# Patient Record
Sex: MALE | Race: OTHER | HISPANIC OR LATINO | URBAN - METROPOLITAN AREA
[De-identification: names, ages, dates, MRNs, and addresses within clinical notes are randomized per-mention and may not be internally consistent; named-entity substitution may affect disease eponyms.]

---

## 2018-08-02 ENCOUNTER — EMERGENCY (EMERGENCY)
Facility: HOSPITAL | Age: 63
LOS: 1 days | Discharge: HOME | End: 2018-08-02
Attending: EMERGENCY MEDICINE | Admitting: EMERGENCY MEDICINE
Payer: SELF-PAY

## 2018-08-02 VITALS
SYSTOLIC BLOOD PRESSURE: 142 MMHG | DIASTOLIC BLOOD PRESSURE: 88 MMHG | RESPIRATION RATE: 18 BRPM | OXYGEN SATURATION: 97 % | HEART RATE: 59 BPM

## 2018-08-02 VITALS
RESPIRATION RATE: 18 BRPM | OXYGEN SATURATION: 99 % | HEART RATE: 61 BPM | TEMPERATURE: 97 F | SYSTOLIC BLOOD PRESSURE: 156 MMHG | DIASTOLIC BLOOD PRESSURE: 103 MMHG

## 2018-08-02 DIAGNOSIS — R06.02 SHORTNESS OF BREATH: ICD-10-CM

## 2018-08-02 DIAGNOSIS — F17.200 NICOTINE DEPENDENCE, UNSPECIFIED, UNCOMPLICATED: ICD-10-CM

## 2018-08-02 LAB
ALBUMIN SERPL ELPH-MCNC: 4.2 G/DL — SIGNIFICANT CHANGE UP (ref 3.3–5)
ALP SERPL-CCNC: 63 U/L — SIGNIFICANT CHANGE UP (ref 40–120)
ALT FLD-CCNC: 25 U/L — SIGNIFICANT CHANGE UP (ref 10–45)
ANION GAP SERPL CALC-SCNC: 13 MMOL/L — SIGNIFICANT CHANGE UP (ref 5–17)
APTT BLD: 28.8 SEC — SIGNIFICANT CHANGE UP (ref 27.5–37.4)
AST SERPL-CCNC: 25 U/L — SIGNIFICANT CHANGE UP (ref 10–40)
BASOPHILS NFR BLD AUTO: 0.2 % — SIGNIFICANT CHANGE UP (ref 0–2)
BILIRUB SERPL-MCNC: 0.4 MG/DL — SIGNIFICANT CHANGE UP (ref 0.2–1.2)
BUN SERPL-MCNC: 14 MG/DL — SIGNIFICANT CHANGE UP (ref 7–23)
CALCIUM SERPL-MCNC: 9.4 MG/DL — SIGNIFICANT CHANGE UP (ref 8.4–10.5)
CHLORIDE SERPL-SCNC: 102 MMOL/L — SIGNIFICANT CHANGE UP (ref 96–108)
CK MB CFR SERPL CALC: 4.4 NG/ML — SIGNIFICANT CHANGE UP (ref 0–6.7)
CK SERPL-CCNC: 277 U/L — HIGH (ref 30–200)
CO2 SERPL-SCNC: 24 MMOL/L — SIGNIFICANT CHANGE UP (ref 22–31)
CREAT SERPL-MCNC: 0.82 MG/DL — SIGNIFICANT CHANGE UP (ref 0.5–1.3)
EOSINOPHIL NFR BLD AUTO: 0.4 % — SIGNIFICANT CHANGE UP (ref 0–6)
GLUCOSE SERPL-MCNC: 119 MG/DL — HIGH (ref 70–99)
HCT VFR BLD CALC: 41.6 % — SIGNIFICANT CHANGE UP (ref 39–50)
HGB BLD-MCNC: 13.1 G/DL — SIGNIFICANT CHANGE UP (ref 13–17)
INR BLD: 0.99 — SIGNIFICANT CHANGE UP (ref 0.88–1.16)
LYMPHOCYTES # BLD AUTO: 8.5 % — LOW (ref 13–44)
MCHC RBC-ENTMCNC: 26.7 PG — LOW (ref 27–34)
MCHC RBC-ENTMCNC: 31.5 G/DL — LOW (ref 32–36)
MCV RBC AUTO: 84.9 FL — SIGNIFICANT CHANGE UP (ref 80–100)
MONOCYTES NFR BLD AUTO: 6.7 % — SIGNIFICANT CHANGE UP (ref 2–14)
NEUTROPHILS NFR BLD AUTO: 84.2 % — HIGH (ref 43–77)
PLATELET # BLD AUTO: 220 K/UL — SIGNIFICANT CHANGE UP (ref 150–400)
POTASSIUM SERPL-MCNC: 4.1 MMOL/L — SIGNIFICANT CHANGE UP (ref 3.5–5.3)
POTASSIUM SERPL-SCNC: 4.1 MMOL/L — SIGNIFICANT CHANGE UP (ref 3.5–5.3)
PROT SERPL-MCNC: 7 G/DL — SIGNIFICANT CHANGE UP (ref 6–8.3)
PROTHROM AB SERPL-ACNC: 11 SEC — SIGNIFICANT CHANGE UP (ref 9.8–12.7)
RBC # BLD: 4.9 M/UL — SIGNIFICANT CHANGE UP (ref 4.2–5.8)
RBC # FLD: 13.9 % — SIGNIFICANT CHANGE UP (ref 10.3–16.9)
SODIUM SERPL-SCNC: 139 MMOL/L — SIGNIFICANT CHANGE UP (ref 135–145)
TROPONIN T SERPL-MCNC: <0.01 NG/ML — SIGNIFICANT CHANGE UP (ref 0–0.01)
WBC # BLD: 9.4 K/UL — SIGNIFICANT CHANGE UP (ref 3.8–10.5)
WBC # FLD AUTO: 9.4 K/UL — SIGNIFICANT CHANGE UP (ref 3.8–10.5)

## 2018-08-02 PROCEDURE — 84484 ASSAY OF TROPONIN QUANT: CPT

## 2018-08-02 PROCEDURE — 83880 ASSAY OF NATRIURETIC PEPTIDE: CPT

## 2018-08-02 PROCEDURE — 99283 EMERGENCY DEPT VISIT LOW MDM: CPT | Mod: 25

## 2018-08-02 PROCEDURE — 71045 X-RAY EXAM CHEST 1 VIEW: CPT

## 2018-08-02 PROCEDURE — 93010 ELECTROCARDIOGRAM REPORT: CPT

## 2018-08-02 PROCEDURE — 71045 X-RAY EXAM CHEST 1 VIEW: CPT | Mod: 26

## 2018-08-02 PROCEDURE — 85610 PROTHROMBIN TIME: CPT

## 2018-08-02 PROCEDURE — 99053 MED SERV 10PM-8AM 24 HR FAC: CPT

## 2018-08-02 PROCEDURE — 85025 COMPLETE CBC W/AUTO DIFF WBC: CPT

## 2018-08-02 PROCEDURE — 85730 THROMBOPLASTIN TIME PARTIAL: CPT

## 2018-08-02 PROCEDURE — 93005 ELECTROCARDIOGRAM TRACING: CPT

## 2018-08-02 PROCEDURE — 82553 CREATINE MB FRACTION: CPT

## 2018-08-02 PROCEDURE — 82550 ASSAY OF CK (CPK): CPT

## 2018-08-02 PROCEDURE — 99285 EMERGENCY DEPT VISIT HI MDM: CPT | Mod: 25

## 2018-08-02 PROCEDURE — 80053 COMPREHEN METABOLIC PANEL: CPT

## 2018-08-02 PROCEDURE — 36415 COLL VENOUS BLD VENIPUNCTURE: CPT

## 2018-08-02 NOTE — ED ADULT NURSE REASSESSMENT NOTE - NS ED NURSE REASSESS COMMENT FT1
Handoff report received from CIRILO Anaya. Patient remains in ED on cardiac monitor. Denies any chest pain. Reports SOB that started this morning. Patient speaking in full sentences. IV inserted, labs collected and sent to lab. Will continue to monitor.

## 2018-08-02 NOTE — ED ADULT NURSE NOTE - BREATHING, MLM
Nervio pinzado en el theresa  Un nervio pinzado en el theresa (radiculopatía cervical) se debe a que un nervio que va de la médula brown hasta el brazo está irritado o presionado. Puede deberse a un disco vertebral que está deformado. Un disco vertebral es eleno almohadilla que hay entre cada eleno de las vértebras (huesos) de la columna. También puede deberse a que eleno articulación de la columna se estrechó a causa de la artritis.    Un nervio pinzado puede provocar entumecimiento, hormigueo, un dolor profundo o un dolor punzante con sensación de electricidad que va desde un lado del theresa hasta los dedos de la mano de amrit lado.  Un nervio puede pinzarse después de eleno torcedura o flexión repentina y forzada (shan en un accidente de automóvil) o después de tamar realizado un movimiento simple fuad extraño. Cualquiera sea el poncho, suele tamar espasmos musculares, lo cual aumenta el dolor.     Cuidados en la casa  Siga estos consejos para cuidar de usted en boland casa:  · Descanse y relaje los músculos. Emplee eleno almohada cómoda para apoyar la maris y mantener la columna en eleno posición natural (neutra). Boland maris no debería estar inclinada hacia adelante ni hacia atrás. Puede usar eleno toalla enrollada para lograr un mejor apoyo. Mantenga boland theresa alineado con el jayleen del cuerpo cuando esté parado o sentado. Mantenga la maris hacia arriba y los hombros hacia abajo. No realice actividades en las que tenga que  mucho el theresa.  · Puede usar calor y masajes para ayudar a aliviar el dolor. Poth eleno ducha o un baño, o use eleno almohadilla térmica. También puede aliviarle usar eleno compresa fría. Para formar eleno compresa fría, puede envolver con eleno toalla yobani eleno bolsa plástica con cubos de hielo o hielo molido. Pruebe tanto con frío shan con calor, y use el método que se sienta mejor para usted. River esto por 20 minutos varias veces al día.  · Puede usar acetaminofén o ibuprofeno para controlar el dolor, a menos  que le hayan recetado otro medicamento para calmar el dolor. Si tiene eleno enfermedad crónica del hígado o de los riñones, consulte a boland proveedor antes de usar estos medicamentos. También hable con boland proveedor si tiene eleno úlcera de estómago o sangrado gastrointestinal.  · Disminuya el estrés. El estrés puede hacer que el dolor tarde más en irse.  · River todos los ejercicios o estiramientos que le hayan indicado cuando le dieron el eddie del hospital.  · Use un theresa ortopédico blando si así se lo indicaron.  · Puede que necesite cirugía si tuvo eleno lesión más grave.  Visita de control  Programe eleno visita de control con boland proveedor de atención médica, o según se le haya indicado, si no comienza a sentirse mejor al cabo de eleno semana. Puede que necesite más pruebas. Dígale a boland proveedor si tiene fiebre o escalofríos, o si bajó de peso.  Si le tomaron radiografías, las evaluará un radiólogo. Le informarán de los nuevos hallazgos que puedan afectar boland atención médica.  ¿Cuándo debe buscar atención médica?  Llame a boland proveedor de atención médica de inmediato si tiene cualquiera de los siguientes síntomas:  · El dolor empeora, incluso después de liane el medicamento recetado  · Debilidad en el brazo  · El entumecimiento en el brazo empeora  · Dificultades para respirar o tragar  © [id number] [employer]. [address] [place] [id number]. Todos los derechos reservados. Esta información no pretende sustituir la atención médica profesional. Sólo boland médico puede diagnosticar y tratar un problema de aly.         Spontaneous, unlabored and symmetrical

## 2018-08-02 NOTE — ED PROVIDER NOTE - MEDICAL DECISION MAKING DETAILS
Negative workup for ACS, PTX, pericarditis, anemia/electrolyte disturbance, NSR on EKG w/out ischemic changes, comfortable in ED. Unlikely PE. Given return precautions and f/u with [facility] as pt does not have a primary care doctor.

## 2018-08-02 NOTE — ED PROVIDER NOTE - OBJECTIVE STATEMENT
62 y/o M w/no sig pmhx, daily smoker, p/[place] in which pt felt as though he couldn't catch his breath and his tongue felt dry. Denied CP/pressure or palpitations. No abd pain or n/v. Sx lasted 2-5min and aborted without medical intervention. Feels in his normal state of health at time of interview, approx 1.5hr later. No LE pain/swelling.

## 2018-08-02 NOTE — ED ADULT NURSE NOTE - NSIMPLEMENTINTERV_GEN_ALL_ED
Implemented All Universal Safety Interventions:  [place] to call system. Call bell, personal items and telephone within reach. Instruct patient to call for assistance. Room bathroom lighting operational. Non-slip footwear when patient is off stretcher. Physically safe environment: no spills, clutter or unnecessary equipment. Stretcher in lowest position, wheels locked, appropriate side rails in place.